# Patient Record
Sex: MALE | Race: WHITE | Employment: OTHER | URBAN - NONMETROPOLITAN AREA
[De-identification: names, ages, dates, MRNs, and addresses within clinical notes are randomized per-mention and may not be internally consistent; named-entity substitution may affect disease eponyms.]

---

## 2017-02-01 ENCOUNTER — OFFICE VISIT (OUTPATIENT)
Dept: CARDIOLOGY CLINIC | Age: 82
End: 2017-02-01

## 2017-02-01 VITALS
SYSTOLIC BLOOD PRESSURE: 116 MMHG | DIASTOLIC BLOOD PRESSURE: 80 MMHG | HEART RATE: 80 BPM | HEIGHT: 69 IN | BODY MASS INDEX: 31.7 KG/M2 | WEIGHT: 214 LBS

## 2017-02-01 DIAGNOSIS — I77.9 BILATERAL CAROTID ARTERY DISEASE (HCC): Primary | Chronic | ICD-10-CM

## 2017-02-01 DIAGNOSIS — E78.5 HYPERLIPIDEMIA, UNSPECIFIED HYPERLIPIDEMIA TYPE: ICD-10-CM

## 2017-02-01 DIAGNOSIS — I10 ESSENTIAL HYPERTENSION: Chronic | ICD-10-CM

## 2017-02-01 DIAGNOSIS — I73.9 PAD (PERIPHERAL ARTERY DISEASE) (HCC): ICD-10-CM

## 2017-02-01 PROCEDURE — 1036F TOBACCO NON-USER: CPT | Performed by: INTERNAL MEDICINE

## 2017-02-01 PROCEDURE — G8419 CALC BMI OUT NRM PARAM NOF/U: HCPCS | Performed by: INTERNAL MEDICINE

## 2017-02-01 PROCEDURE — 99213 OFFICE O/P EST LOW 20 MIN: CPT | Performed by: INTERNAL MEDICINE

## 2017-02-01 PROCEDURE — G8484 FLU IMMUNIZE NO ADMIN: HCPCS | Performed by: INTERNAL MEDICINE

## 2017-02-01 PROCEDURE — G8427 DOCREV CUR MEDS BY ELIG CLIN: HCPCS | Performed by: INTERNAL MEDICINE

## 2017-02-01 PROCEDURE — 4040F PNEUMOC VAC/ADMIN/RCVD: CPT | Performed by: INTERNAL MEDICINE

## 2017-02-01 PROCEDURE — 1123F ACP DISCUSS/DSCN MKR DOCD: CPT | Performed by: INTERNAL MEDICINE

## 2017-02-08 ENCOUNTER — OFFICE VISIT (OUTPATIENT)
Dept: ORTHOPEDIC SURGERY | Age: 82
End: 2017-02-08

## 2017-02-08 VITALS — WEIGHT: 214 LBS | HEIGHT: 69 IN | BODY MASS INDEX: 31.7 KG/M2

## 2017-02-08 DIAGNOSIS — M17.11 PRIMARY OSTEOARTHRITIS OF RIGHT KNEE: Primary | ICD-10-CM

## 2017-02-08 PROCEDURE — 20610 DRAIN/INJ JOINT/BURSA W/O US: CPT | Performed by: ORTHOPAEDIC SURGERY

## 2017-02-08 PROCEDURE — 4040F PNEUMOC VAC/ADMIN/RCVD: CPT | Performed by: ORTHOPAEDIC SURGERY

## 2017-02-08 PROCEDURE — 1123F ACP DISCUSS/DSCN MKR DOCD: CPT | Performed by: ORTHOPAEDIC SURGERY

## 2017-02-08 PROCEDURE — 99213 OFFICE O/P EST LOW 20 MIN: CPT | Performed by: ORTHOPAEDIC SURGERY

## 2017-02-08 PROCEDURE — G8484 FLU IMMUNIZE NO ADMIN: HCPCS | Performed by: ORTHOPAEDIC SURGERY

## 2017-02-08 PROCEDURE — 1036F TOBACCO NON-USER: CPT | Performed by: ORTHOPAEDIC SURGERY

## 2017-02-08 PROCEDURE — G8419 CALC BMI OUT NRM PARAM NOF/U: HCPCS | Performed by: ORTHOPAEDIC SURGERY

## 2017-02-08 PROCEDURE — G8427 DOCREV CUR MEDS BY ELIG CLIN: HCPCS | Performed by: ORTHOPAEDIC SURGERY

## 2017-02-21 RX ORDER — AMLODIPINE BESYLATE 5 MG/1
5 TABLET ORAL DAILY
Qty: 90 TABLET | Refills: 3 | Status: SHIPPED | OUTPATIENT
Start: 2017-02-21 | End: 2017-09-18 | Stop reason: SDUPTHER

## 2017-03-01 ENCOUNTER — OFFICE VISIT (OUTPATIENT)
Dept: ORTHOPEDIC SURGERY | Age: 82
End: 2017-03-01

## 2017-03-01 VITALS — BODY MASS INDEX: 31.7 KG/M2 | HEIGHT: 69 IN | WEIGHT: 214 LBS

## 2017-03-01 DIAGNOSIS — M25.561 ACUTE PAIN OF RIGHT KNEE: Primary | ICD-10-CM

## 2017-03-01 PROCEDURE — G8427 DOCREV CUR MEDS BY ELIG CLIN: HCPCS | Performed by: ORTHOPAEDIC SURGERY

## 2017-03-01 PROCEDURE — 4040F PNEUMOC VAC/ADMIN/RCVD: CPT | Performed by: ORTHOPAEDIC SURGERY

## 2017-03-01 PROCEDURE — 1036F TOBACCO NON-USER: CPT | Performed by: ORTHOPAEDIC SURGERY

## 2017-03-01 PROCEDURE — 1123F ACP DISCUSS/DSCN MKR DOCD: CPT | Performed by: ORTHOPAEDIC SURGERY

## 2017-03-01 PROCEDURE — 99213 OFFICE O/P EST LOW 20 MIN: CPT | Performed by: ORTHOPAEDIC SURGERY

## 2017-03-01 PROCEDURE — G8484 FLU IMMUNIZE NO ADMIN: HCPCS | Performed by: ORTHOPAEDIC SURGERY

## 2017-03-01 PROCEDURE — 73564 X-RAY EXAM KNEE 4 OR MORE: CPT | Performed by: ORTHOPAEDIC SURGERY

## 2017-03-01 PROCEDURE — G8417 CALC BMI ABV UP PARAM F/U: HCPCS | Performed by: ORTHOPAEDIC SURGERY

## 2017-03-15 ENCOUNTER — OFFICE VISIT (OUTPATIENT)
Dept: ORTHOPEDIC SURGERY | Age: 82
End: 2017-03-15

## 2017-03-15 VITALS — BODY MASS INDEX: 31.7 KG/M2 | WEIGHT: 214 LBS | HEIGHT: 69 IN

## 2017-03-15 DIAGNOSIS — M17.10 LOCALIZED OSTEOARTHROSIS, LOWER LEG: Primary | ICD-10-CM

## 2017-03-15 DIAGNOSIS — M17.10 PRIMARY OSTEOARTHRITIS OF KNEE, UNSPECIFIED LATERALITY: ICD-10-CM

## 2017-03-15 PROCEDURE — G8484 FLU IMMUNIZE NO ADMIN: HCPCS | Performed by: ORTHOPAEDIC SURGERY

## 2017-03-15 PROCEDURE — 4040F PNEUMOC VAC/ADMIN/RCVD: CPT | Performed by: ORTHOPAEDIC SURGERY

## 2017-03-15 PROCEDURE — 1123F ACP DISCUSS/DSCN MKR DOCD: CPT | Performed by: ORTHOPAEDIC SURGERY

## 2017-03-15 PROCEDURE — 1036F TOBACCO NON-USER: CPT | Performed by: ORTHOPAEDIC SURGERY

## 2017-03-15 PROCEDURE — G8417 CALC BMI ABV UP PARAM F/U: HCPCS | Performed by: ORTHOPAEDIC SURGERY

## 2017-03-15 PROCEDURE — G8427 DOCREV CUR MEDS BY ELIG CLIN: HCPCS | Performed by: ORTHOPAEDIC SURGERY

## 2017-03-15 PROCEDURE — 99213 OFFICE O/P EST LOW 20 MIN: CPT | Performed by: ORTHOPAEDIC SURGERY

## 2017-04-19 ENCOUNTER — OFFICE VISIT (OUTPATIENT)
Dept: ORTHOPEDIC SURGERY | Age: 82
End: 2017-04-19

## 2017-04-19 DIAGNOSIS — G89.29 CHRONIC PAIN OF RIGHT KNEE: Primary | ICD-10-CM

## 2017-04-19 DIAGNOSIS — M17.11 PRIMARY OSTEOARTHRITIS OF RIGHT KNEE: ICD-10-CM

## 2017-04-19 DIAGNOSIS — M25.561 CHRONIC PAIN OF RIGHT KNEE: Primary | ICD-10-CM

## 2017-04-19 PROCEDURE — 20610 DRAIN/INJ JOINT/BURSA W/O US: CPT | Performed by: ORTHOPAEDIC SURGERY

## 2017-04-19 PROCEDURE — 99214 OFFICE O/P EST MOD 30 MIN: CPT | Performed by: ORTHOPAEDIC SURGERY

## 2017-04-19 PROCEDURE — G8417 CALC BMI ABV UP PARAM F/U: HCPCS | Performed by: ORTHOPAEDIC SURGERY

## 2017-04-19 PROCEDURE — G8427 DOCREV CUR MEDS BY ELIG CLIN: HCPCS | Performed by: ORTHOPAEDIC SURGERY

## 2017-04-19 PROCEDURE — 1123F ACP DISCUSS/DSCN MKR DOCD: CPT | Performed by: ORTHOPAEDIC SURGERY

## 2017-04-19 PROCEDURE — 4040F PNEUMOC VAC/ADMIN/RCVD: CPT | Performed by: ORTHOPAEDIC SURGERY

## 2017-04-19 PROCEDURE — 1036F TOBACCO NON-USER: CPT | Performed by: ORTHOPAEDIC SURGERY

## 2017-04-21 ENCOUNTER — HOSPITAL ENCOUNTER (OUTPATIENT)
Dept: PHYSICAL THERAPY | Age: 82
Discharge: HOME | End: 2017-04-30
Admitting: ORTHOPAEDIC SURGERY

## 2017-04-26 ENCOUNTER — HOSPITAL ENCOUNTER (OUTPATIENT)
Dept: PHYSICAL THERAPY | Age: 82
Discharge: HOME | End: 2017-04-26
Admitting: ORTHOPAEDIC SURGERY

## 2017-04-28 ENCOUNTER — HOSPITAL ENCOUNTER (OUTPATIENT)
Dept: PHYSICAL THERAPY | Age: 82
Discharge: HOME | End: 2017-04-28
Admitting: ORTHOPAEDIC SURGERY

## 2017-05-03 ENCOUNTER — HOSPITAL ENCOUNTER (OUTPATIENT)
Dept: PHYSICAL THERAPY | Age: 82
Discharge: HOME | End: 2017-05-03
Admitting: ORTHOPAEDIC SURGERY

## 2017-05-05 ENCOUNTER — HOSPITAL ENCOUNTER (OUTPATIENT)
Dept: PHYSICAL THERAPY | Age: 82
Discharge: HOME | End: 2017-05-05
Admitting: ORTHOPAEDIC SURGERY

## 2017-05-10 ENCOUNTER — HOSPITAL ENCOUNTER (OUTPATIENT)
Dept: PHYSICAL THERAPY | Age: 82
Discharge: HOME | End: 2017-05-10
Admitting: ORTHOPAEDIC SURGERY

## 2017-05-12 ENCOUNTER — HOSPITAL ENCOUNTER (OUTPATIENT)
Dept: PHYSICAL THERAPY | Age: 82
Discharge: HOME | End: 2017-05-12
Admitting: ORTHOPAEDIC SURGERY

## 2017-05-17 ENCOUNTER — HOSPITAL ENCOUNTER (OUTPATIENT)
Dept: PHYSICAL THERAPY | Age: 82
Discharge: HOME | End: 2017-05-17
Admitting: ORTHOPAEDIC SURGERY

## 2017-05-19 ENCOUNTER — HOSPITAL ENCOUNTER (OUTPATIENT)
Dept: PHYSICAL THERAPY | Age: 82
Discharge: HOME | End: 2017-05-19
Admitting: ORTHOPAEDIC SURGERY

## 2017-05-24 ENCOUNTER — HOSPITAL ENCOUNTER (OUTPATIENT)
Dept: PHYSICAL THERAPY | Age: 82
Discharge: HOME | End: 2017-05-24
Admitting: ORTHOPAEDIC SURGERY

## 2017-05-31 ENCOUNTER — OFFICE VISIT (OUTPATIENT)
Dept: ORTHOPEDIC SURGERY | Age: 82
End: 2017-05-31

## 2017-05-31 VITALS — HEIGHT: 69 IN | WEIGHT: 214 LBS | BODY MASS INDEX: 31.7 KG/M2

## 2017-05-31 DIAGNOSIS — M17.0 PRIMARY OSTEOARTHRITIS OF BOTH KNEES: Primary | ICD-10-CM

## 2017-05-31 PROCEDURE — 1036F TOBACCO NON-USER: CPT | Performed by: ORTHOPAEDIC SURGERY

## 2017-05-31 PROCEDURE — G8427 DOCREV CUR MEDS BY ELIG CLIN: HCPCS | Performed by: ORTHOPAEDIC SURGERY

## 2017-05-31 PROCEDURE — G8417 CALC BMI ABV UP PARAM F/U: HCPCS | Performed by: ORTHOPAEDIC SURGERY

## 2017-05-31 PROCEDURE — 4040F PNEUMOC VAC/ADMIN/RCVD: CPT | Performed by: ORTHOPAEDIC SURGERY

## 2017-05-31 PROCEDURE — 1123F ACP DISCUSS/DSCN MKR DOCD: CPT | Performed by: ORTHOPAEDIC SURGERY

## 2017-05-31 PROCEDURE — 99213 OFFICE O/P EST LOW 20 MIN: CPT | Performed by: ORTHOPAEDIC SURGERY

## 2017-06-02 ENCOUNTER — HOSPITAL ENCOUNTER (OUTPATIENT)
Dept: PHYSICAL THERAPY | Age: 82
Discharge: HOME | End: 2017-06-02
Admitting: ORTHOPAEDIC SURGERY

## 2017-06-06 ENCOUNTER — HOSPITAL ENCOUNTER (OUTPATIENT)
Dept: PHYSICAL THERAPY | Age: 82
Discharge: HOME | End: 2017-06-06
Admitting: ORTHOPAEDIC SURGERY

## 2017-06-09 ENCOUNTER — HOSPITAL ENCOUNTER (OUTPATIENT)
Dept: PHYSICAL THERAPY | Age: 82
Discharge: HOME | End: 2017-06-09
Admitting: ORTHOPAEDIC SURGERY

## 2017-06-14 ENCOUNTER — HOSPITAL ENCOUNTER (OUTPATIENT)
Dept: PHYSICAL THERAPY | Age: 82
Discharge: HOME | End: 2017-06-14
Admitting: ORTHOPAEDIC SURGERY

## 2017-06-16 ENCOUNTER — HOSPITAL ENCOUNTER (OUTPATIENT)
Dept: PHYSICAL THERAPY | Age: 82
Discharge: HOME | End: 2017-06-16
Admitting: ORTHOPAEDIC SURGERY

## 2017-06-21 ENCOUNTER — HOSPITAL ENCOUNTER (OUTPATIENT)
Dept: PHYSICAL THERAPY | Age: 82
Discharge: HOME | End: 2017-06-21
Admitting: ORTHOPAEDIC SURGERY

## 2017-06-23 ENCOUNTER — HOSPITAL ENCOUNTER (OUTPATIENT)
Dept: PHYSICAL THERAPY | Age: 82
Discharge: HOME | End: 2017-06-23
Admitting: ORTHOPAEDIC SURGERY

## 2017-06-27 ENCOUNTER — HOSPITAL ENCOUNTER (OUTPATIENT)
Dept: PHYSICAL THERAPY | Age: 82
Discharge: HOME | End: 2017-06-27
Admitting: ORTHOPAEDIC SURGERY

## 2017-06-30 ENCOUNTER — HOSPITAL ENCOUNTER (OUTPATIENT)
Dept: PHYSICAL THERAPY | Age: 82
Discharge: HOME | End: 2017-06-30
Admitting: ORTHOPAEDIC SURGERY

## 2017-07-05 ENCOUNTER — TELEPHONE (OUTPATIENT)
Dept: CARDIOLOGY CLINIC | Age: 82
End: 2017-07-05

## 2017-07-05 ENCOUNTER — HOSPITAL ENCOUNTER (OUTPATIENT)
Dept: PHYSICAL THERAPY | Age: 82
Discharge: HOME | End: 2017-07-05
Admitting: ORTHOPAEDIC SURGERY

## 2017-07-05 RX ORDER — ISOSORBIDE MONONITRATE 120 MG/1
120 TABLET, EXTENDED RELEASE ORAL DAILY
Qty: 90 TABLET | Refills: 3 | Status: SHIPPED | OUTPATIENT
Start: 2017-07-05

## 2017-07-12 ENCOUNTER — HOSPITAL ENCOUNTER (OUTPATIENT)
Dept: PHYSICAL THERAPY | Age: 82
Discharge: HOME | End: 2017-07-12
Admitting: ORTHOPAEDIC SURGERY

## 2017-07-14 ENCOUNTER — HOSPITAL ENCOUNTER (OUTPATIENT)
Dept: PHYSICAL THERAPY | Age: 82
Discharge: HOME | End: 2017-07-14
Admitting: ORTHOPAEDIC SURGERY

## 2017-07-21 ENCOUNTER — HOSPITAL ENCOUNTER (OUTPATIENT)
Dept: PHYSICAL THERAPY | Age: 82
Discharge: HOME | End: 2017-07-21
Admitting: ORTHOPAEDIC SURGERY

## 2017-07-27 ENCOUNTER — HOSPITAL ENCOUNTER (OUTPATIENT)
Dept: PHYSICAL THERAPY | Age: 82
Discharge: HOME | End: 2017-07-27
Admitting: ORTHOPAEDIC SURGERY

## 2017-08-02 ENCOUNTER — HOSPITAL ENCOUNTER (OUTPATIENT)
Dept: PHYSICAL THERAPY | Age: 82
Discharge: HOME | End: 2017-08-02
Admitting: ORTHOPAEDIC SURGERY

## 2017-08-09 ENCOUNTER — HOSPITAL ENCOUNTER (OUTPATIENT)
Dept: PHYSICAL THERAPY | Age: 82
Discharge: HOME | End: 2017-08-09
Admitting: ORTHOPAEDIC SURGERY

## 2017-08-16 ENCOUNTER — HOSPITAL ENCOUNTER (OUTPATIENT)
Dept: PHYSICAL THERAPY | Age: 82
Discharge: HOME | End: 2017-08-16
Admitting: ORTHOPAEDIC SURGERY

## 2017-09-06 ENCOUNTER — HOSPITAL ENCOUNTER (OUTPATIENT)
Dept: PHYSICAL THERAPY | Age: 82
Discharge: HOME | End: 2017-09-06
Admitting: ORTHOPAEDIC SURGERY

## 2017-09-13 ENCOUNTER — HOSPITAL ENCOUNTER (OUTPATIENT)
Dept: PHYSICAL THERAPY | Age: 82
Discharge: HOME | End: 2017-09-13
Admitting: ORTHOPAEDIC SURGERY

## 2017-09-18 ENCOUNTER — OFFICE VISIT (OUTPATIENT)
Dept: CARDIOLOGY CLINIC | Age: 82
End: 2017-09-18

## 2017-09-18 VITALS
SYSTOLIC BLOOD PRESSURE: 118 MMHG | DIASTOLIC BLOOD PRESSURE: 68 MMHG | HEIGHT: 69 IN | WEIGHT: 210 LBS | BODY MASS INDEX: 31.1 KG/M2 | HEART RATE: 80 BPM

## 2017-09-18 DIAGNOSIS — I77.9 BILATERAL CAROTID ARTERY DISEASE (HCC): Primary | Chronic | ICD-10-CM

## 2017-09-18 DIAGNOSIS — I10 ESSENTIAL HYPERTENSION: Chronic | ICD-10-CM

## 2017-09-18 DIAGNOSIS — E78.5 HYPERLIPIDEMIA, UNSPECIFIED HYPERLIPIDEMIA TYPE: ICD-10-CM

## 2017-09-18 PROCEDURE — 4040F PNEUMOC VAC/ADMIN/RCVD: CPT | Performed by: INTERNAL MEDICINE

## 2017-09-18 PROCEDURE — G8417 CALC BMI ABV UP PARAM F/U: HCPCS | Performed by: INTERNAL MEDICINE

## 2017-09-18 PROCEDURE — 1036F TOBACCO NON-USER: CPT | Performed by: INTERNAL MEDICINE

## 2017-09-18 PROCEDURE — 99214 OFFICE O/P EST MOD 30 MIN: CPT | Performed by: INTERNAL MEDICINE

## 2017-09-18 PROCEDURE — 1123F ACP DISCUSS/DSCN MKR DOCD: CPT | Performed by: INTERNAL MEDICINE

## 2017-09-18 PROCEDURE — G8427 DOCREV CUR MEDS BY ELIG CLIN: HCPCS | Performed by: INTERNAL MEDICINE

## 2017-09-18 RX ORDER — MULTIVIT WITH MINERALS/LUTEIN
TABLET ORAL DAILY
COMMUNITY

## 2017-09-18 RX ORDER — AMLODIPINE BESYLATE 5 MG/1
5 TABLET ORAL DAILY
Qty: 90 TABLET | Refills: 3 | Status: SHIPPED | OUTPATIENT
Start: 2017-09-18 | End: 2018-02-06 | Stop reason: SDUPTHER

## 2017-09-20 ENCOUNTER — HOSPITAL ENCOUNTER (OUTPATIENT)
Dept: PHYSICAL THERAPY | Age: 82
Discharge: HOME | End: 2017-09-20
Admitting: ORTHOPAEDIC SURGERY

## 2017-09-27 ENCOUNTER — HOSPITAL ENCOUNTER (OUTPATIENT)
Dept: PHYSICAL THERAPY | Age: 82
Discharge: HOME | End: 2017-09-27
Admitting: ORTHOPAEDIC SURGERY

## 2017-10-04 ENCOUNTER — HOSPITAL ENCOUNTER (OUTPATIENT)
Dept: PHYSICAL THERAPY | Age: 82
Discharge: HOME | End: 2017-10-04
Admitting: ORTHOPAEDIC SURGERY

## 2017-10-04 NOTE — FLOWSHEET NOTE
Static EC on airex and A/P weight shifts 5 10s              Therapeutic Exercise and NMR EXR  [x] (40328) Provided verbal/tactile cueing for activities related to strengthening, flexibility, endurance, ROM for improvements in LE, proximal hip, and core control with self care, mobility, lifting, ambulation.  [] (83473) Provided verbal/tactile cueing for activities related to improving balance, coordination, kinesthetic sense, posture, motor skill, proprioception  to assist with LE, proximal hip, and core control in self care, mobility, lifting, ambulation and eccentric single leg control.      NMR and Therapeutic Activities:    [x] (41251 or 25022) Provided verbal/tactile cueing for activities related to improving balance, coordination, kinesthetic sense, posture, motor skill, proprioception and motor activation to allow for proper function of core, proximal hip and LE with self care and ADLs  [] (66381) Gait Re-education- Provided training and instruction to the patient for proper LE, core and proximal hip recruitment and positioning and eccentric body weight control with ambulation re-education including up and down stairs     Home Exercise Program:    [x] (19178) Reviewed/Progressed HEP activities related to strengthening, flexibility, endurance, ROM of core, proximal hip and LE for functional self-care, mobility, lifting and ambulation/stair navigation   [] (69381)Reviewed/Progressed HEP activities related to improving balance, coordination, kinesthetic sense, posture, motor skill, proprioception of core, proximal hip and LE for self care, mobility, lifting, and ambulation/stair navigation      Manual Treatments:  PROM / STM / Oscillations-Mobs:  G-I, II, III, IV (PA's, Inf., Post.)  [x] (12231) Provided manual therapy to mobilize LE, proximal hip and/or LS spine soft tissue/joints for the purpose of modulating pain, promoting relaxation,  increasing ROM, reducing/eliminating soft tissue swelling/inflammation/restriction, improving soft tissue extensibility and allowing for proper ROM for normal function with self care, mobility, lifting and ambulation. Modalities:    Charges:   Timed Code Treatment Minutes: 48   Total Treatment Minutes: 48     [] EVAL (LOW) 41689 (typically 20 minutes face-to-face)  [] EVAL (MOD) 32232 (typically 30 minutes face-to-face)  [] EVAL (HIGH) 23004 (typically 45 minutes face-to-face)  [] RE-EVAL     [x] CB(63149) x  2 KX  [] IONTO  [] NMR (18589) x      [] VASO  [x] Manual (62562) x  1  KX [] Other:  [] TA x       [] Mech Traction (92436)  [] ES(attended) (01447)      [] ES (un) (67108):     GOALS:     Patient stated goal: less pain and movement    Therapist goals for Patient:   Short Term Goals: To be achieved in: 2 weeks  1. Independent in HEP and progression per patient tolerance, in order to prevent re-injury. Met  2. Patient will have a decrease in pain to facilitate improvement in movement, function, and ADLs as indicated by Functional Deficits. Met    Long Term Goals: To be achieved in: 6 weeks  1. Disability index score of 30% or less for the LEFS to assist with reaching prior level of function. LEFS 38.75% (IE 60%)  2. Patient will demonstrate increased AROM to symmetrical to allow for proper joint functioning as indicated by patients Functional Deficits. - 75% MET  3. Patient will demonstrate an increase in Strength to good proximal hip strength and control, within 5lb HHD in LE to allow for proper functional mobility as indicated by patients Functional Deficits. - 75% MET  4. Patient will return to daily increased physical and functional activities without increased symptoms or restriction. - 75% MET    Progression Towards Functional goals:  [x] Patient is progressing as expected towards functional goals listed. [] Progression is slowed due to complexities listed. [] Progression has been slowed due to co-morbidities.   [] Plan just implemented, too soon to assess goals progression  [] Other:     ASSESSMENT:  Knee extension now back to baseline. Patient has been responding well to [facility] and stretching with improvement in knee symptoms at conclusion, encouraged patient to use  on adductors at home as well. Continued deficits in RLE functional strength.      Return to Play: (if applicable)   []  Stage 1: Intro to Strength   []  Stage 2: Return to Run and Strength   []  Stage 3: Return to Jump and Strength   []  Stage 4: Dynamic Strength and Agility   []  Stage 5: Sport Specific Training     []  Ready to Return to Play, Meets All Above Stages   []  Not Ready for Return to Sports   Comments:                         Treatment/Activity Tolerance:  [x] Patient tolerated treatment well [] Patient limited by fatique  [] Patient limited by pain  [] Patient limited by other medical complications  [] Other:     Prognosis: [x] Good [] Fair  [] Poor    Patient Requires Follow-up: [x] Yes  [] No      PLAN:   [x] Continue per plan of care [] Alter current plan (see comments)  [] Plan of care initiated [] Hold pending MD visit [] Discharge    Electronically signed by: Manuela Arrington PT

## 2017-10-18 ENCOUNTER — HOSPITAL ENCOUNTER (OUTPATIENT)
Dept: PHYSICAL THERAPY | Age: 82
Discharge: HOME | End: 2017-10-18
Admitting: ORTHOPAEDIC SURGERY

## 2017-10-18 NOTE — FLOWSHEET NOTE
[facility], [facility]        Physical Therapy Daily Treatment Note        Date:  10/18/2017    Patient Name:  Marleny Stevens  [facility]" :  9/3/1928  MRN: [id number]  Restrictions/Precautions:    Medical/Treatment Diagnosis Information:  Diagnosis: chronic R knee pain  Treatment Diagnosis: Chronic Pain of Right knee M25.561, M65.90  Insurance/Certification information:  PT Insurance Information: [employer]/Medicare  Physician Information:  Referring Practitioner: Dr. Miladis Thakkar of care signed (Y/N):     Date of Patient follow up with Physician:  Jason Bowman    G-Code (if applicable):      Date G-Code Applied:  17       Progress Note: [x]  Yes  []  No  Next due by: Visit #20       Latex Allergy:  [x]NO      []YES  Preferred Language for Healthcare:   [x]English       []other:     Visit # Insurance Allowable   33  (KX) MCR        Pain: 0-1/10    Subjective: Patient states that his knee has been doing well overall, feeling a little more stiff the last couple days for some reason, possibly due to a shift in the weather. Objective:     RESTRICTIONS/PRECAUTIONS: R knee OA, weak R glute, R ITB syndrome    Exercises/Interventions:     Therapeutic Ex x  30 min Sets/sec Reps Notes   BIKE 5min     QS 5 12    SAQ 5 15 3# full roll   SLR 1 10 0#   TKE 5 30 40#   SC  10 4 way, UE support   Slide Lunge 2 10 Retro at counter   Leg Press - DL 3 10 80#   Calf stretch/HS stretch 30 3 slantboard/seated         Manual Intervention x 18 min      Knee mobs/PROM      Tib/Fem Mobs 8 min  Flex/ext   Patella Mobs 2 min     STM - manual 8 min  R quad   IT band mobs 0 min           NMR re-education x 0 min      [place]ian/Biofeedback 10/10      G. Med activation/sidelying      G. Max Activation/prone      Hip Ext full ROM G.  Activation      Bosu Bal and Prop- G Med      Single leg stance/Balance/Prop 5 10s Tandem stance with SBA    Bosu Retro G. Med act      Static EC on airex and A/P weight shifts 5 10s              Therapeutic Exercise and NMR EXR  [x] (19827) Provided verbal/tactile cueing for activities related to strengthening, flexibility, endurance, ROM for improvements in LE, proximal hip, and core control with self care, mobility, lifting, ambulation.  [] (55161) Provided verbal/tactile cueing for activities related to improving balance, coordination, kinesthetic sense, posture, motor skill, proprioception  to assist with LE, proximal hip, and core control in self care, mobility, lifting, ambulation and eccentric single leg control.      NMR and Therapeutic Activities:    [x] (64947 or 14810) Provided verbal/tactile cueing for activities related to improving balance, coordination, kinesthetic sense, posture, motor skill, proprioception and motor activation to allow for proper function of core, proximal hip and LE with self care and ADLs  [] (63286) Gait Re-education- Provided training and instruction to the patient for proper LE, core and proximal hip recruitment and positioning and eccentric body weight control with ambulation re-education including up and down stairs     Home Exercise Program:    [x] (78758) Reviewed/Progressed HEP activities related to strengthening, flexibility, endurance, ROM of core, proximal hip and LE for functional self-care, mobility, lifting and ambulation/stair navigation   [] (21479)Reviewed/Progressed HEP activities related to improving balance, coordination, kinesthetic sense, posture, motor skill, proprioception of core, proximal hip and LE for self care, mobility, lifting, and ambulation/stair navigation      Manual Treatments:  PROM / STM / Oscillations-Mobs:  G-I, II, III, IV (PA's, Inf., Post.)  [x] (76803) Provided manual therapy to mobilize LE, proximal hip and/or LS spine soft tissue/joints for the purpose of modulating pain, promoting relaxation,  increasing ROM, reducing/eliminating soft tissue swelling/inflammation/restriction, improving soon to assess goals progression  [] Other:     ASSESSMENT:  Patient has been responding well to [facility] and stretching with improvement in knee symptoms at conclusion. Continued deficits in RLE functional strength.      Return to Play: (if applicable)   []  Stage 1: Intro to Strength   []  Stage 2: Return to Run and Strength   []  Stage 3: Return to Jump and Strength   []  Stage 4: Dynamic Strength and Agility   []  Stage 5: Sport Specific Training     []  Ready to Return to Play, Meets All Above Stages   []  Not Ready for Return to Sports   Comments:                         Treatment/Activity Tolerance:  [x] Patient tolerated treatment well [] Patient limited by fatique  [] Patient limited by pain  [] Patient limited by other medical complications  [] Other:     Prognosis: [x] Good [] Fair  [] Poor    Patient Requires Follow-up: [x] Yes  [] No      PLAN:   [x] Continue per plan of care [] Alter current plan (see comments)  [] Plan of care initiated [] Hold pending MD visit [] Discharge    Electronically signed by: Larissa Motley PT

## 2017-11-01 ENCOUNTER — HOSPITAL ENCOUNTER (OUTPATIENT)
Dept: OTHER | Age: 82
Discharge: HOME | End: 2017-11-30
Attending: ORTHOPAEDIC SURGERY | Admitting: ORTHOPAEDIC SURGERY

## 2017-11-01 ENCOUNTER — HOSPITAL ENCOUNTER (OUTPATIENT)
Dept: PHYSICAL THERAPY | Age: 82
Discharge: HOME | End: 2017-11-01
Admitting: ORTHOPAEDIC SURGERY

## 2017-11-01 NOTE — FLOWSHEET NOTE
quad   IT band mobs 0 min           NMR re-education x 0 min      Filipino/Biofeedback 10/10      G. Med activation/sidelying      G. Max Activation/prone      Hip Ext full ROM G. Activation      Bosu Bal and Prop- G Med      Single leg stance/Balance/Prop 5 10s Tandem stance with SBA    Bosu Retro G. Med act      Static EC on airex and A/P weight shifts 5 10s              Therapeutic Exercise and NMR EXR  [x] (91258) Provided verbal/tactile cueing for activities related to strengthening, flexibility, endurance, ROM for improvements in LE, proximal hip, and core control with self care, mobility, lifting, ambulation.  [] (40393) Provided verbal/tactile cueing for activities related to improving balance, coordination, kinesthetic sense, posture, motor skill, proprioception  to assist with LE, proximal hip, and core control in self care, mobility, lifting, ambulation and eccentric single leg control.      NMR and Therapeutic Activities:    [x] (24578 or 07250) Provided verbal/tactile cueing for activities related to improving balance, coordination, kinesthetic sense, posture, motor skill, proprioception and motor activation to allow for proper function of core, proximal hip and LE with self care and ADLs  [] (57352) Gait Re-education- Provided training and instruction to the patient for proper LE, core and proximal hip recruitment and positioning and eccentric body weight control with ambulation re-education including up and down stairs     Home Exercise Program:    [x] (55898) Reviewed/Progressed HEP activities related to strengthening, flexibility, endurance, ROM of core, proximal hip and LE for functional self-care, mobility, lifting and ambulation/stair navigation   [] (25163)Reviewed/Progressed HEP activities related to improving balance, coordination, kinesthetic sense, posture, motor skill, proprioception of core, proximal hip and LE for self care, mobility, lifting, and ambulation/stair navigation      Manual Treatments:  PROM / STM / Oscillations-Mobs:  G-I, II, III, IV (PA's, Inf., Post.)  [x] (78646) Provided manual therapy to mobilize LE, proximal hip and/or LS spine soft tissue/joints for the purpose of modulating pain, promoting relaxation,  increasing ROM, reducing/eliminating soft tissue swelling/inflammation/restriction, improving soft tissue extensibility and allowing for proper ROM for normal function with self care, mobility, lifting and ambulation. Modalities:    Charges:   Timed Code Treatment Minutes: 18   Total Treatment Minutes: 18     [] EVAL (LOW) 91082 (typically 20 minutes face-to-face)  [] EVAL (MOD) 19763 (typically 30 minutes face-to-face)  [] EVAL (HIGH) 78766 (typically 45 minutes face-to-face)  [] RE-EVAL     [x] UL(02415) x  1 KX  [] IONTO  [] NMR (08124) x      [] VASO  [x] Manual (39094) x  1  KX [] Other:  [] TA x       [] Mech Traction (28531)  [] ES(attended) (37326)      [] ES (un) (26671):     GOALS:     Patient stated goal: less pain and movement    Therapist goals for Patient:   Short Term Goals: To be achieved in: 2 weeks  1. Independent in HEP and progression per patient tolerance, in order to prevent re-injury. Met  2. Patient will have a decrease in pain to facilitate improvement in movement, function, and ADLs as indicated by Functional Deficits. Met    Long Term Goals: To be achieved in: 6 weeks  1. Disability index score of 30% or less for the LEFS to assist with reaching prior level of function. LEFS 38.75% (IE 60%)  2. Patient will demonstrate increased AROM to symmetrical to allow for proper joint functioning as indicated by patients Functional Deficits. - 75% MET  3. Patient will demonstrate an increase in Strength to good proximal hip strength and control, within 5lb HHD in LE to allow for proper functional mobility as indicated by patients Functional Deficits. - 75% MET  4.  Patient will return to daily increased physical and functional activities without increased

## 2017-11-08 ENCOUNTER — HOSPITAL ENCOUNTER (OUTPATIENT)
Dept: PHYSICAL THERAPY | Age: 82
Discharge: HOME | End: 2017-11-08
Admitting: ORTHOPAEDIC SURGERY

## 2017-11-08 NOTE — FLOWSHEET NOTE
[facility], [place]        Physical Therapy Daily Treatment Note        Date:  2017    Patient Name:  Virgil Simon  [facility]" :  9/3/1928  MRN: [id number]  Restrictions/Precautions:    Medical/Treatment Diagnosis Information:  Diagnosis: chronic R knee pain  Treatment Diagnosis: Chronic Pain of Right knee M25.561, A55.62  Insurance/Certification information:  PT Insurance Information: [employer]/Medicare  Physician Information:  Referring Practitioner: Dr. Miriam Chinchilla of care signed (Y/N):     Date of Patient follow up with Physician:  Marshall Mendoza    G-Code (if applicable):      Date G-Code Applied:  17       Progress Note: [x]  Yes  []  No  Next due by: Visit #40       Latex Allergy:  [x]NO      []YES  Preferred Language for Healthcare:   [x]English       []other:     Visit # Insurance Allowable   35  (KX) MCR        Pain: 0-1/10    Subjective: Patient states that he is no longer having chest pains like he was last week. Knee feeling a little stiff today, but he is feeling a little stiff all over due to the changing weather. Still some discomfort along the distal adductors and medial hamstring attachment. Objective:     RESTRICTIONS/PRECAUTIONS: R knee OA, weak R glute, R ITB syndrome    Exercises/Interventions:     Therapeutic Ex x 30 min Sets/sec Reps Notes   BIKE 5 min     SAQ 5 15 4# full roll   SLR 1 10 0#   TKE 5 30 40#   SC March 1 10 4 way, UE support   Slide Lunge 2 10 Retro at counter   Leg Press - DL 3 10 80#   Cybex HS curl 2 12 25#   Calf stretch/HS stretch 30 3 slantboard/seated         Manual Intervention x 14 min      Knee mobs/PROM      Tib/Fem Mobs 6 min  Flex/ext   Patella Mobs 2 min     STM - manual 6 min  R quad   IT band mobs 0 min           NMR re-education x 0 min      Stateless/Biofeedback 10/10      G. Med activation/sidelying      G. Max Activation/prone      Hip Ext full ROM G.  Activation      Bosu Bal and Prop- G Med

## 2017-11-22 ENCOUNTER — HOSPITAL ENCOUNTER (OUTPATIENT)
Dept: PHYSICAL THERAPY | Age: 82
Discharge: HOME | End: 2017-11-22
Admitting: ORTHOPAEDIC SURGERY

## 2017-11-22 NOTE — FLOWSHEET NOTE
[facility]  Orthopaedics and Sports Rehabilitation, [place]        Physical Therapy Daily Treatment Note        Date:  2017    Patient Name:  Karon Loyd  [facility]" :  9/3/1928  MRN: [id number]  Restrictions/Precautions:    Medical/Treatment Diagnosis Information:  Diagnosis: chronic R knee pain  Treatment Diagnosis: Chronic Pain of Right knee M25.561, Q60.12  Insurance/Certification information:  PT Insurance Information: [facility]/Medicare  Physician Information:  Referring Practitioner: Dr. Ayanna Danielsr of care signed (Y/N):     Date of Patient follow up with Physician:  Maria G Smith    G-Code (if applicable):      Date G-Code Applied:  17       Progress Note: [x]  Yes  []  No  Next due by: Visit #40       Latex Allergy:  [x]NO      []YES  Preferred Language for Healthcare:   [x]English       []other:     Visit # Insurance Allowable   36  (KX) MCR        Pain: 0-1/10    Subjective: Patient reports increased stiffness in aching in R knee due to change in weather. Objective:     RESTRICTIONS/PRECAUTIONS: R knee OA, weak R glute, R ITB syndrome    Exercises/Interventions:     Therapeutic Ex x 30 min Sets/sec Reps Notes   BIKE 5 min     SAQ 5 15 4# full roll   SLR 1 10 0#   TKE 5 30 40#   SC March 1 10 4 way, UE support   Slide Lunge 2 10 Retro at counter   Leg Press - DL 3 10 80#   Cybex HS curl 2 12 25#   Calf stretch/HS stretch 30 3 slantboard/seated         Manual Intervention x 14 min      Knee mobs/PROM      Tib/Fem Mobs 6 min  Flex/ext   Patella Mobs 2 min     STM - manual 6 min  R quad   IT band mobs 0 min           NMR re-education x 0 min      [place]oran/Biofeedback 10/10      G. Med activation/sidelying      G. Max Activation/prone      Hip Ext full ROM G.  Activation      Bosu Bal and Prop- G Med      Single leg stance/Balance/Prop 5 10s Tandem stance with SBA    Bosu Retro G. Med act      Static EC on airex and A/P weight shifts 5 10s              Therapeutic Exercise and NMR EXR  [x] (79121) Provided verbal/tactile cueing for activities related to strengthening, flexibility, endurance, ROM for improvements in LE, proximal hip, and core control with self care, mobility, lifting, ambulation.  [] (06966) Provided verbal/tactile cueing for activities related to improving balance, coordination, kinesthetic sense, posture, motor skill, proprioception  to assist with LE, proximal hip, and core control in self care, mobility, lifting, ambulation and eccentric single leg control.      NMR and Therapeutic Activities:    [x] (58385 or 63156) Provided verbal/tactile cueing for activities related to improving balance, coordination, kinesthetic sense, posture, motor skill, proprioception and motor activation to allow for proper function of core, proximal hip and LE with self care and ADLs  [] (54488) Gait Re-education- Provided training and instruction to the patient for proper LE, core and proximal hip recruitment and positioning and eccentric body weight control with ambulation re-education including up and down stairs     Home Exercise Program:    [x] (30736) Reviewed/Progressed HEP activities related to strengthening, flexibility, endurance, ROM of core, proximal hip and LE for functional self-care, mobility, lifting and ambulation/stair navigation   [] (39132)Reviewed/Progressed HEP activities related to improving balance, coordination, kinesthetic sense, posture, motor skill, proprioception of core, proximal hip and LE for self care, mobility, lifting, and ambulation/stair navigation      Manual Treatments:  PROM / STM / Oscillations-Mobs:  G-I, II, III, IV (PA's, Inf., Post.)  [x] (94599) Provided manual therapy to mobilize LE, proximal hip and/or LS spine soft tissue/joints for the purpose of modulating pain, promoting relaxation,  increasing ROM, reducing/eliminating soft tissue swelling/inflammation/restriction, improving soft tissue extensibility and allowing for proper ROM for normal Patient has been responding well to [facility] and stretching with improvement in knee symptoms at conclusion. Continued deficits in RLE functional strength, especially eccentrically.      Return to Play: (if applicable)   []  Stage 1: Intro to Strength   []  Stage 2: Return to Run and Strength   []  Stage 3: Return to Jump and Strength   []  Stage 4: Dynamic Strength and Agility   []  Stage 5: Sport Specific Training     []  Ready to Return to Play, Meets All Above Stages   []  Not Ready for Return to Sports   Comments:                         Treatment/Activity Tolerance:  [x] Patient tolerated treatment well [] Patient limited by fatique  [] Patient limited by pain  [] Patient limited by other medical complications  [] Other:     Prognosis: [x] Good [] Fair  [] Poor    Patient Requires Follow-up: [x] Yes  [] No      PLAN:   [x] Continue per plan of care [] Alter current plan (see comments)  [] Plan of care initiated [] Hold pending MD visit [] Discharge    Electronically signed by: Eric Ceballos PTA

## 2017-12-01 ENCOUNTER — TELEPHONE (OUTPATIENT)
Dept: CARDIOLOGY CLINIC | Age: 82
End: 2017-12-01

## 2017-12-01 ENCOUNTER — HOSPITAL ENCOUNTER (OUTPATIENT)
Dept: OTHER | Age: 82
Discharge: HOME | End: 2017-12-31
Attending: ORTHOPAEDIC SURGERY | Admitting: ORTHOPAEDIC SURGERY

## 2017-12-06 ENCOUNTER — HOSPITAL ENCOUNTER (OUTPATIENT)
Dept: PHYSICAL THERAPY | Age: 82
Discharge: HOME | End: 2017-12-06
Admitting: ORTHOPAEDIC SURGERY

## 2017-12-06 NOTE — FLOWSHEET NOTE
[facility]P [facility]  Orthopaedics and Sports Rehabilitation, [place]        Physical Therapy Daily Treatment Note        Date:  2017    Patient Name:  Radha Parish  [facility]" :  9/3/1928  MRN: [id number]  Restrictions/Precautions:    Medical/Treatment Diagnosis Information:  Diagnosis: chronic R knee pain  Treatment Diagnosis: Chronic Pain of Right knee M25.561, I15.70  Insurance/Certification information:  PT Insurance Information: Terry Abebe/Medicare  Physician Information:  Referring Practitioner: Dr. Abisai Clark of care signed (Y/N):     Date of Patient follow up with Physician:  Orion Curran    G-Code (if applicable):      Date G-Code Applied:  17       Progress Note: [x]  Yes  []  No  Next due by: Visit #40       Latex Allergy:  [x]NO      []YES  Preferred Language for Healthcare:   [x]English       []other:     Visit # Insurance Allowable   37  (KX) MCR        Pain: 0-1/10    Subjective: Patient reports feeling better since last treatment session. Inclement weather has increased acheyness but feels better with exercise and stretching during therapy sessions especially for walking. Objective:      RESTRICTIONS/PRECAUTIONS: R knee OA, weak R glute, R ITB syndrome    Exercises/Interventions:     Therapeutic Ex x 30 min Sets/sec Reps Notes   BIKE 5 min     SAQ 5 15 4# full roll   SLR 1 10 0#   TKE 5 00 40#   SC March 1 10 4 way, UE support   Slide Lunge 2 10 Retro at counter   Leg Press - DL 3 10 80#   Cybex HS curl 2 12 25#   Calf stretch/HS stretch 30 3 slantboard/seated         Manual Intervention x 14 min      Knee mobs/PROM      Tib/Fem Mobs 6 min  Flex/ext   Patella Mobs 2 min     STM - manual 6 min  R quad/lateral quad   IT band mobs 0 min           NMR re-education x 0 min      Chadian/Biofeedback 10/10      G. Med activation/sidelying      G. Max Activation/prone      Hip Ext full ROM G.  Activation      Bosu Bal and Prop- G Med      Single leg stance/Balance/Prop 5 10s Tandem stance with SBA    Bosu Retro G. Med act      Static EC on airex and A/P weight shifts 5 10s              Therapeutic Exercise and NMR EXR  [x] (85450) Provided verbal/tactile cueing for activities related to strengthening, flexibility, endurance, ROM for improvements in LE, proximal hip, and core control with self care, mobility, lifting, ambulation.  [] (87110) Provided verbal/tactile cueing for activities related to improving balance, coordination, kinesthetic sense, posture, motor skill, proprioception  to assist with LE, proximal hip, and core control in self care, mobility, lifting, ambulation and eccentric single leg control.      NMR and Therapeutic Activities:    [x] (15441 or 27268) Provided verbal/tactile cueing for activities related to improving balance, coordination, kinesthetic sense, posture, motor skill, proprioception and motor activation to allow for proper function of core, proximal hip and LE with self care and ADLs  [] (43916) Gait Re-education- Provided training and instruction to the patient for proper LE, core and proximal hip recruitment and positioning and eccentric body weight control with ambulation re-education including up and down stairs     Home Exercise Program:    [x] (82819) Reviewed/Progressed HEP activities related to strengthening, flexibility, endurance, ROM of core, proximal hip and LE for functional self-care, mobility, lifting and ambulation/stair navigation   [] (54327)Reviewed/Progressed HEP activities related to improving balance, coordination, kinesthetic sense, posture, motor skill, proprioception of core, proximal hip and LE for self care, mobility, lifting, and ambulation/stair navigation      Manual Treatments:  PROM / STM / Oscillations-Mobs:  G-I, II, III, IV (PA's, Inf., Post.)  [x] (43194) Provided manual therapy to mobilize LE, proximal hip and/or LS spine soft tissue/joints for the purpose of modulating pain, promoting relaxation,  increasing ROM, reducing/eliminating soft tissue swelling/inflammation/restriction, improving soft tissue extensibility and allowing for proper ROM for normal function with self care, mobility, lifting and ambulation. Modalities:COLD PACK 10 minutes on R knee at end of session    Charges:   Timed Code Treatment Minutes: 44   Total Treatment Minutes: 54     [] EVAL (LOW) 54057 (typically 20 minutes face-to-face)  [] EVAL (MOD) 82170 (typically 30 minutes face-to-face)  [] EVAL (HIGH) 62189 (typically 45 minutes face-to-face)  [] RE-EVAL     [x] CS(10332) x  2 KX  [] IONTO  [] NMR (08275) x      [] VASO  [x] Manual (84096) x  1  KX [] Other:  [] TA x       [] Mech Traction (51107)  [] ES(attended) (66523)      [] ES (un) (48136):     GOALS:     Patient stated goal: less pain and movement    Therapist goals for Patient:   Short Term Goals: To be achieved in: 2 weeks  1. Independent in HEP and progression per patient tolerance, in order to prevent re-injury. Met  2. Patient will have a decrease in pain to facilitate improvement in movement, function, and ADLs as indicated by Functional Deficits. Met    Long Term Goals: To be achieved in: 6 weeks  1. Disability index score of 30% or less for the LEFS to assist with reaching prior level of function. LEFS 38.75% (IE 60%)  2. Patient will demonstrate increased AROM to symmetrical to allow for proper joint functioning as indicated by patients Functional Deficits. - 75% MET  3. Patient will demonstrate an increase in Strength to good proximal hip strength and control, within 5lb HHD in LE to allow for proper functional mobility as indicated by patients Functional Deficits. - 75% MET  4. Patient will return to daily increased physical and functional activities without increased symptoms or restriction. - 75% MET    Progression Towards Functional goals:  [x] Patient is progressing as expected towards functional goals listed. [] Progression is slowed due to complexities listed.   [] Progression has been slowed due to co-morbidities. [] Plan just implemented, too soon to assess goals progression  [] Other:     ASSESSMENT:  Patient has been responding well to [facility] and stretching with improvement in knee symptoms at conclusion. Continued deficits in RLE functional strength, especially eccentrically.  Activity tolerance has improved     Return to Play: (if applicable)   []  Stage 1: Intro to Strength   []  Stage 2: Return to Run and Strength   []  Stage 3: Return to Jump and Strength   []  Stage 4: Dynamic Strength and Agility   []  Stage 5: Sport Specific Training     []  Ready to Return to Play, Meets All Above Stages   []  Not Ready for Return to Sports   Comments:                         Treatment/Activity Tolerance:  [x] Patient tolerated treatment well [] Patient limited by fatique  [] Patient limited by pain  [] Patient limited by other medical complications  [] Other:     Prognosis: [x] Good [] Fair  [] Poor    Patient Requires Follow-up: [x] Yes  [] No      PLAN:   [x] Continue per plan of care [] Alter current plan (see comments)  [] Plan of care initiated [] Hold pending MD visit [] Discharge    Electronically signed by: Ana Rosa York PT

## 2017-12-20 ENCOUNTER — HOSPITAL ENCOUNTER (OUTPATIENT)
Dept: PHYSICAL THERAPY | Age: 82
Discharge: HOME | End: 2017-12-20
Admitting: ORTHOPAEDIC SURGERY

## 2017-12-20 NOTE — PLAN OF CARE
[address]        Physical Therapy Re-Certification Plan of Roni Velez      Dear  Dr. Angelina Ellington,    We had the pleasure of treating the following patient for physical therapy services at [address] [place] [address]. A summary of our findings can be found in the updated assessment below. This includes our plan of care. If you have any questions or concerns regarding these findings, please do not hesitate to contact me at the office phone number checked above.   Thank you for the referral.     Physician Signature:________________________________Date:__________________  By signing above (or electronic signature), therapists plan is approved by physician    Date Range Of Visits: 17 - 17  Total Visits to Date: 45  Overall Response to Treatment:   [x]Patient is responding well to treatment and improvement is noted with regards  to goals   []Patient should continue to improve in reasonable time if they continue HEP   []Patient has plateaued and is no longer responding to skilled PT intervention    []Patient is getting worse and would benefit from return to referring MD   []Patient unable to adhere to initial POC   []Other:         Physical Therapy Daily Treatment Note      Date:  2017    Patient Name:  Richard Ronquillo  \"MARY KATE\" :  9/3/1928  MRN: [id number]  Restrictions/Precautions:    Medical/Treatment Diagnosis Information:  Diagnosis: chronic R knee pain  Treatment Diagnosis: Chronic Pain of Right knee M25.561, N56.75  Insurance/Certification information:  PT Insurance Information: Sherry Abebe/Medicare  Physician Information:  Referring Practitioner: Dr. Titi Riddle of care signed (Y/N):     Date of Patient follow up with Physician:  Shashank Vital    G-Code (if applicable):      Date G-Code Applied:  17  PT G-Codes  Functional Assessment Tool Used: LEFS  Score: 52.5% impairment  Functional Limitation: Mobility: Walking and moving around  Mobility: Walking and Moving Around Current Status (): At least 40 percent but less than 60 percent impaired, limited or restricted  Mobility: Walking and Moving Around Goal Status (): At least 20 percent but less than 40 percent impaired, limited or restricted    Progress Note: [x]  Yes  []  No  Next due by: Visit #40       Latex Allergy:  [x]NO      []YES  Preferred Language for Healthcare:   [x]English       []other:     Visit # Insurance Allowable   38  (KX) MCR        Pain: 0-1/10    Subjective: Patient denies any worsening pain since last treatment session. States he has been complying to HEP and continues to show good activity tolerance especially with prolonged walking, stairs and getting in/out car. Objective:      RESTRICTIONS/PRECAUTIONS: R knee OA, weak R glute, R ITB syndrome    Exercises/Interventions:     Therapeutic Ex x 30 min Sets/sec Reps Notes   BIKE 5 min     SAQ 5s 20 4# full roll   SLR 2 10 0#   TKE 5 20 40#   SC March 1 10 4 way, UE support   BOSU lunge 10 10 Fwd/lateral   Slide Lunge 2 10 Retro at counter   Leg Press - DL 3 10 80#   Cybex HS curl 2 12 25#   Glute side walks/ standing glute kicks 2 10 maroon   Calf stretch/HS stretch 30 3 slantboard/seated         Manual Intervention x 14 min      Knee mobs/PROM      Tib/Fem Mobs 8 min  Flex/ext   Patella Mobs 2 min     STM - manual 0 min  R quad/lateral quad   IT band mobs 0 min           NMR re-education x 0 min      [place]/Biofeedback 10/10      G. Med activation/sidelying      G. Max Activation/prone      Hip Ext full ROM G.  Activation      Bosu Bal and Prop- G Med      Single leg stance/Balance/Prop 5 0s Tandem stance with SBA    Bosu Retro G. Med act      Static EC on airex and A/P weight shifts 5 10s              Therapeutic Exercise and NMR EXR  [x] (23475) Provided verbal/tactile cueing for activities related to strengthening, flexibility, endurance, ROM for improvements in LE, proximal hip, and core

## 2018-01-01 ENCOUNTER — HOSPITAL ENCOUNTER (OUTPATIENT)
Dept: OTHER | Age: 83
Discharge: HOME | End: 2018-01-31
Attending: ORTHOPAEDIC SURGERY | Admitting: ORTHOPAEDIC SURGERY

## 2018-01-03 ENCOUNTER — HOSPITAL ENCOUNTER (OUTPATIENT)
Dept: PHYSICAL THERAPY | Age: 83
Discharge: HOME | End: 2018-01-03
Admitting: ORTHOPAEDIC SURGERY

## 2018-01-03 NOTE — FLOWSHEET NOTE
[facility], [place]        Physical Therapy Daily Treatment Note      Date:  2017    Patient Name:  Christine York  [facility]" :  9/3/1928  MRN: [id number]  Restrictions/Precautions:    Medical/Treatment Diagnosis Information:  Diagnosis: chronic R knee pain  Treatment Diagnosis: Chronic Pain of Right knee M25.561, T18.03  Insurance/Certification information:  PT Insurance Information: [address] [place]/Medicare  Physician Information:  Referring Practitioner: Dr. Lomas Service of care signed (Y/N):     Date of Patient follow up with Physician:  Louise Camacho    G-Code (if applicable):      Date G-Code Applied:  17  PT G-Codes  Functional Assessment Tool Used: LEFS  Score: 52.5% impairment  Functional Limitation: Mobility: Walking and moving around  Mobility: Walking and Moving Around Current Status (): At least 40 percent but less than 60 percent impaired, limited or restricted  Mobility: Walking and Moving Around Goal Status (): At least 20 percent but less than 40 percent impaired, limited or restricted    Progress Note: [x]  Yes  []  No  Next due by: Visit #40       Latex Allergy:  [x]NO      []YES  Preferred Language for Healthcare:   [x]English       []other:     Visit # Insurance Allowable   39  (KX) MCR        Pain: 0-1/10    Subjective: Patient reports that his strength and function are beginning to improve. No complaints of pain entering PT today, mild stiffness.       Objective:      RESTRICTIONS/PRECAUTIONS: R knee OA, weak R glute, R ITB syndrome    Exercises/Interventions:     Therapeutic Ex x 30 min Sets/sec Reps Notes   BIKE 5 min     SAQ 5s 20 4# full roll   SLR 2 10 0#   TKE 5 20 40#   SC  10 4 way, UE support   BOSU lunge 10 10 Fwd/lateral   Slide Lunge 2 10 Retro at counter   Leg Press - DL 3 10 80#   Cybex HS curl 2 12 25#   Glute side walks/ standing glute kicks 2 10 maroon   Calf stretch/HS stretch 30 3 slantboard/seated

## 2018-01-17 ENCOUNTER — HOSPITAL ENCOUNTER (OUTPATIENT)
Dept: PHYSICAL THERAPY | Age: 83
Discharge: HOME | End: 2018-01-17
Admitting: ORTHOPAEDIC SURGERY

## 2018-01-17 NOTE — FLOWSHEET NOTE
activities related to improving balance, coordination, kinesthetic sense, posture, motor skill, proprioception of core, proximal hip and LE for self care, mobility, lifting, and ambulation/stair navigation      Manual Treatments:  PROM / STM / Oscillations-Mobs:  G-I, II, III, IV (PA's, Inf., Post.)  [x] (42873) Provided manual therapy to mobilize LE, proximal hip and/or LS spine soft tissue/joints for the purpose of modulating pain, promoting relaxation,  increasing ROM, reducing/eliminating soft tissue swelling/inflammation/restriction, improving soft tissue extensibility and allowing for proper ROM for normal function with self care, mobility, lifting and ambulation. Modalities:COLD PACK 10 minutes on R knee at end of session    Charges:   Timed Code Treatment Minutes: 50   Total Treatment Minutes: 60     [] EVAL (LOW) 64986 (typically 20 minutes face-to-face)  [] EVAL (MOD) 21853 (typically 30 minutes face-to-face)  [] EVAL (HIGH) 98455 (typically 45 minutes face-to-face)  [] RE-EVAL     [x] JL(00517) x  2 KX  [] IONTO  [] NMR (67978) x      [] VASO  [x] Manual (22888) x  1  KX [] Other:  [] TA x       [] Mech Traction (00063)  [] ES(attended) (02784)      [] ES (un) (94281):     GOALS:     Patient stated goal: less pain and movement    Therapist goals for Patient:   Short Term Goals: To be achieved in: 2 weeks  1. Independent in HEP and progression per patient tolerance, in order to prevent re-injury. Met  2. Patient will have a decrease in pain to facilitate improvement in movement, function, and ADLs as indicated by Functional Deficits. Met    Long Term Goals: To be achieved in: 6 weeks  1. Disability index score of 30% or less for the LEFS to assist with reaching prior level of function. LEFS 38.75% (IE 60%)  2. Patient will demonstrate increased AROM to symmetrical to allow for proper joint functioning as indicated by patients Functional Deficits. - 75% MET  3.  Patient will demonstrate an increase in Strength to good proximal hip strength and control, within 5lb HHD in LE to allow for proper functional mobility as indicated by patients Functional Deficits. - 75% MET  4. Patient will return to daily increased physical and functional activities without increased symptoms or restriction. - 75% MET    Progression Towards Functional goals:  [x] Patient is progressing as expected towards functional goals listed. [] Progression is slowed due to complexities listed. [] Progression has been slowed due to co-morbidities. [] Plan just implemented, too soon to assess goals progression  [] Other:     ASSESSMENT:  Patient initially guarded with passive joint mobilization and gentle stretching. Knee range and flexibility as improved from start of manual treatment and at conclusion per pt reports 'I couldn't do that before I started coming here'. Activity tolerance is improving each date and  continued deficits in RLE functional strength, especially eccentrically noted requiring ongoing skilled physical therapy intervention for strength and quad activation.     Return to Play: (if applicable)   []  Stage 1: Intro to Strength   []  Stage 2: Return to Run and Strength   []  Stage 3: Return to Jump and Strength   []  Stage 4: Dynamic Strength and Agility   []  Stage 5: Sport Specific Training     []  Ready to Return to Play, Meets All Above Stages   []  Not Ready for Return to Sports   Comments:                         Treatment/Activity Tolerance:  [x] Patient tolerated treatment well [] Patient limited by fatique  [] Patient limited by pain  [] Patient limited by other medical complications  [] Other:     Prognosis: [x] Good [] Fair  [] Poor    Patient Requires Follow-up: [x] Yes  [] No      PLAN:   [x] Continue per plan of care [] Alter current plan (see comments)  [] Plan of care initiated [] Hold pending MD visit [] Discharge    Electronically signed by: Greg Jaimes PTA

## 2018-02-01 ENCOUNTER — HOSPITAL ENCOUNTER (OUTPATIENT)
Dept: OTHER | Age: 83
Discharge: HOME | End: 2018-02-28
Attending: ORTHOPAEDIC SURGERY | Admitting: ORTHOPAEDIC SURGERY

## 2018-02-06 ENCOUNTER — OFFICE VISIT (OUTPATIENT)
Dept: CARDIOLOGY CLINIC | Age: 83
End: 2018-02-06

## 2018-02-06 VITALS
WEIGHT: 213 LBS | HEIGHT: 69 IN | SYSTOLIC BLOOD PRESSURE: 116 MMHG | BODY MASS INDEX: 31.55 KG/M2 | HEART RATE: 72 BPM | DIASTOLIC BLOOD PRESSURE: 76 MMHG

## 2018-02-06 DIAGNOSIS — R06.02 SHORTNESS OF BREATH: ICD-10-CM

## 2018-02-06 DIAGNOSIS — R07.9 CHEST PAIN IN ADULT: Primary | ICD-10-CM

## 2018-02-06 PROCEDURE — 99214 OFFICE O/P EST MOD 30 MIN: CPT | Performed by: INTERNAL MEDICINE

## 2018-02-06 PROCEDURE — 1036F TOBACCO NON-USER: CPT | Performed by: INTERNAL MEDICINE

## 2018-02-06 PROCEDURE — G8427 DOCREV CUR MEDS BY ELIG CLIN: HCPCS | Performed by: INTERNAL MEDICINE

## 2018-02-06 PROCEDURE — G8484 FLU IMMUNIZE NO ADMIN: HCPCS | Performed by: INTERNAL MEDICINE

## 2018-02-06 PROCEDURE — 4040F PNEUMOC VAC/ADMIN/RCVD: CPT | Performed by: INTERNAL MEDICINE

## 2018-02-06 PROCEDURE — G8417 CALC BMI ABV UP PARAM F/U: HCPCS | Performed by: INTERNAL MEDICINE

## 2018-02-06 PROCEDURE — 93000 ELECTROCARDIOGRAM COMPLETE: CPT | Performed by: INTERNAL MEDICINE

## 2018-02-06 PROCEDURE — 1123F ACP DISCUSS/DSCN MKR DOCD: CPT | Performed by: INTERNAL MEDICINE

## 2018-02-06 RX ORDER — AMLODIPINE BESYLATE 5 MG/1
5 TABLET ORAL DAILY
Qty: 90 TABLET | Refills: 3 | Status: ON HOLD | OUTPATIENT
Start: 2018-02-06 | End: 2018-05-06 | Stop reason: HOSPADM

## 2018-02-06 RX ORDER — AMLODIPINE BESYLATE 5 MG/1
5 TABLET ORAL DAILY
Qty: 90 TABLET | Refills: 3 | Status: CANCELLED | OUTPATIENT
Start: 2018-02-06

## 2018-02-06 NOTE — LETTER
exertional shortness of breath. He was evaluated by Dr Dwayne Trujillo, whom thought that he was not a candidate for peripheral surgery, because of the need for knee replacements as well. He is going to PT, which he thinks is helping his orthopedic pain. He used to get awakened with knee pain at night, but this has subsided. Describes the chest pain episodes have increased to 3-4 times a week although he can exercise and due physical therapy without problems. NTG helps pain within 2-3 minutes . Med changes with last ov have not helped. Past Medical History: Active Ambulatory Problems     Diagnosis Date Noted    Hyperlipidemia 06/14/2011    Carotid artery disease 06/14/2011    Gout attack 12/02/2012    HTN (hypertension) 12/02/2012    Knee osteoarthritis 04/02/2014    Osteoarthrosis involving lower leg 08/12/2015    PAD (peripheral artery disease) 10/09/2015    Precordial pain 11/24/2015    Localized osteoarthrosis, lower leg 12/16/2015    Primary osteoarthritis of both knees 08/17/2016    Primary osteoarthritis of knee 03/15/2017     Resolved Ambulatory Problems     Diagnosis Date Noted    HTN (hypertension) 06/14/2011    Other and unspecified hyperlipidemia 06/21/2011    Essential hypertension, benign 06/21/2011    Peripheral vascular disease ([facility] 06/21/2011    Tachycardia 06/21/2011    Shortness of breath 06/21/2011    Other chest pain 06/21/2011     Past Medical History:   Diagnosis Date    Arrhythmia     Arthritis     Cancer ([address]     Hyperlipidemia     Hypertension     Macular degeneration of right eye     PVD (peripheral vascular disease) ([facility]     Sleep apnea         Surgical History: has a past surgical history that includes Carotid endarterectomy; Rotator cuff repair; Subclavian Bypass Graft; Knee arthroscopy; Tonsillectomy; eye surgery; Femoral-femoral Bypass Graft (1979); Carpal tunnel release (8-); IR Femoral Popliteal Bypass Graft (11/29/12); and Bladder surgery.

## 2018-02-09 NOTE — COMMUNICATION BODY
· Constitutional: there has been no unanticipated weight loss. There's been no change in energy level, sleep pattern, or activity level. · Eyes: No visual changes or diplopia. No scleral icterus. · ENT: No Headaches, hearing loss or vertigo. No mouth sores or sore throat. · Cardiovascular: Reviewed in HPI   · Respiratory: No cough or wheezing, no sputum production. No hematemesis. · Gastrointestinal: No abdominal pain, appetite loss, blood in stools. No change in bowel or bladder habits. · Genitourinary: No dysuria, trouble voiding, or hematuria. · Musculoskeletal:  No gait disturbance, complain of joint pain  · Integumentary: No rash or pruritis. · Neurological: No headache, diplopia, change in muscle strength, numbness or tingling. No change in gait, balance, coordination, mood, affect, memory, mentation, behavior. · Psychiatric: No anxiety, no depression. · Endocrine: No malaise, fatigue or temperature intolerance. No excessive thirst, fluid intake, or urination. No tremor. · Hematologic/Lymphatic: No abnormal bruising or bleeding, blood clots or swollen lymph nodes. · Allergic/Immunologic: No nasal congestion or hives. Physical Examination:    Vitals:    02/06/18 1124   BP: 116/76   Pulse: 72        Constitutional and General Appearance: pleasant gentleman, appears younger than his stated age  Skin:good turgor,intact without lesions  HEENT: EOMI ,normal  Neck:no JVD    Respiratory:  · Normal excursion and expansion without use of accessory muscles  · Resp Auscultation: Normal breath sounds without dullness  Cardiovascular:  · The apical impulses not displaced  · Heart tones are crisp and normal  · Cervical veins are not engorged  · The carotid upstroke is normal in amplitude and contour without delay +bruit  · Regular rate and rhythm without murmurs gallops or rubs  · Peripheral pulses are symmetrical, diminished  · There is no clubbing, cyanosis of the extremities.   · Slight endarterectomy. Left subclavian bypass was occluded. By ultrasound September, 2012 the right carotid 16-49% narrowing, Normal left carotid. Carotids 11/2015: <87% JANNETTE, 42-02% LICA  Will be repeating later this month  1/6/17 carotid ultrasound--less than 50% bilaterally  Will be followed by vascular surgeon  Problem:  Chest pain- continues  Increase imdur to 120 mg daily and changes in amlodipine do not help    EKG interpreted by me in office today sinus rhythm,CRBBB -2/6/18  Plan:  1. Will schedule Lexiscan stress , echo in [place] next week when I am in office to evaluate dyspnea and chest pain    Be Lopez MD, [facility]

## 2018-02-16 ENCOUNTER — HOSPITAL ENCOUNTER (OUTPATIENT)
Dept: NON INVASIVE DIAGNOSTICS | Age: 83
Discharge: HOME | End: 2018-02-16
Attending: INTERNAL MEDICINE | Admitting: INTERNAL MEDICINE

## 2018-02-16 DIAGNOSIS — R07.9 CHEST PAIN: ICD-10-CM

## 2018-02-16 LAB
LV EF: 43 %
LV EF: 60 %
LVEF MODALITY: NORMAL
LVEF MODALITY: NORMAL

## 2018-02-16 RX ORDER — RANOLAZINE 500 MG/1
500 TABLET, EXTENDED RELEASE ORAL 2 TIMES DAILY
Qty: 180 TABLET | Refills: 3 | Status: SHIPPED | OUTPATIENT
Start: 2018-02-16

## 2018-02-16 NOTE — PROGRESS NOTES
Instructed on Lexiscan Stress Test Procedure including possible side effects/ adverse reactions. Patient verbalizes  understanding and denies having any questions. See [facility] Cardiology         Lynda Doran RN

## 2018-02-21 ENCOUNTER — HOSPITAL ENCOUNTER (OUTPATIENT)
Dept: PHYSICAL THERAPY | Age: 83
Discharge: HOME | End: 2018-02-22
Admitting: ORTHOPAEDIC SURGERY

## 2018-02-28 ENCOUNTER — HOSPITAL ENCOUNTER (OUTPATIENT)
Dept: PHYSICAL THERAPY | Age: 83
Discharge: HOME | End: 2018-03-01
Admitting: ORTHOPAEDIC SURGERY

## 2018-02-28 NOTE — FLOWSHEET NOTE
[facility]  Orthopaedics and Sports Rehabilitation, [place]                          Physical Therapy Daily Treatment Note      Date:  2018    Patient Name:  Ishan [facility]" :  9/3/1928  MRN: [id number]  Restrictions/Precautions:    Medical/Treatment Diagnosis Information:  Diagnosis: chronic R knee pain  Treatment Diagnosis: Chronic Pain of Right knee M25.561, I33.17  Insurance/Certification information:  PT Insurance Information: Lis[employer] Guthrie/Medicare  Physician Information:  Referring Practitioner: Dr. Darden Files of care signed (Y/N):     Date of Patient follow up with Physician:  Margret Hawkins    G-Code (if applicable):      Date G-Code Applied:  17  PT G-Codes  Functional Assessment Tool Used: LEFS  Score: 52.5% impairment  Functional Limitation: Mobility: Walking and moving around  Mobility: Walking and Moving Around Current Status (): At least 40 percent but less than 60 percent impaired, limited or restricted  Mobility: Walking and Moving Around Goal Status (): At least 20 percent but less than 40 percent impaired, limited or restricted    Progress Note: [x]  Yes  []  No  Next due by: Visit #52       Latex Allergy:  [x]NO      []YES  Preferred Language for Healthcare:   [x]English       []other:     Visit # Insurance Allowable   43 (5 for 2018) MCR        Pain:  0/10    Subjective: Patient reports that he feels a little bit better today, knee only woke him up 1x last night.       Objective:      RESTRICTIONS/PRECAUTIONS: R knee OA, weak R glute, R ITB syndrome    Objective:            Exercises/Interventions:     Therapeutic Ex x 22 min Sets/sec Reps Notes   BIKE 5 min     SAQ 5s 20 5# full roll   SLR 2 10 0#   TKE 5 20 40#   SC March 1 10 4 way, UE support   BOSU lunge 10 10 Fwd/lateral   Slide Lunge 2 10 Retro at counter   Leg Press - DL 3 10 80#   Cybex HS curl 2 12 25#   Glute side walks/ standing glute kicks 2 10 maroon   Calf stretch/HS stretch 30 3 slantboard/seated         Manual Intervention x 10 min      Knee mobs/PROM      Tib/Fem Mobs 8 min  Flex/ext   Patella Mobs 2 min     STM - manual 4 min  R quad/lateral quad   IT band mobs 0 min           NMR re-education x 12 min      Spanish/Biofeedback 10/10      G. Med activation/sidelying      G. Max Activation/prone      Hip Ext full ROM G. Activation      Bosu Bal and Prop- G Med      Single leg stance/Balance/Prop Tandem stance with SBA    Bosu Retro G. Med act      Static EC on airex and A/P weight shifts; SLS and marching 5 10s marching             Therapeutic Exercise and NMR EXR  [x] (09880) Provided verbal/tactile cueing for activities related to strengthening, flexibility, endurance, ROM for improvements in LE, proximal hip, and core control with self care, mobility, lifting, ambulation.  [] (68627) Provided verbal/tactile cueing for activities related to improving balance, coordination, kinesthetic sense, posture, motor skill, proprioception  to assist with LE, proximal hip, and core control in self care, mobility, lifting, ambulation and eccentric single leg control.      NMR and Therapeutic Activities:    [x] (74852 or 77016) Provided verbal/tactile cueing for activities related to improving balance, coordination, kinesthetic sense, posture, motor skill, proprioception and motor activation to allow for proper function of core, proximal hip and LE with self care and ADLs  [] (49080) Gait Re-education- Provided training and instruction to the patient for proper LE, core and proximal hip recruitment and positioning and eccentric body weight control with ambulation re-education including up and down stairs     Home Exercise Program:    [x] (12072) Reviewed/Progressed HEP activities related to strengthening, flexibility, endurance, ROM of core, proximal hip and LE for functional self-care, mobility, lifting and ambulation/stair navigation   [] (95190)Reviewed/Progressed HEP activities related to improving balance, coordination, kinesthetic sense, posture, motor skill, proprioception of core, proximal hip and LE for self care, mobility, lifting, and ambulation/stair navigation      Manual Treatments:  PROM / STM / Oscillations-Mobs:  G-I, II, III, IV (PA's, Inf., Post.)  [x] (84021) Provided manual therapy to mobilize LE, proximal hip and/or LS spine soft tissue/joints for the purpose of modulating pain, promoting relaxation,  increasing ROM, reducing/eliminating soft tissue swelling/inflammation/restriction, improving soft tissue extensibility and allowing for proper ROM for normal function with self care, mobility, lifting and ambulation. Modalities: ice x 10' at conclusion     Charges:   Timed Code Treatment Minutes: 44   Total Treatment Minutes: 46     [] EVAL (LOW) 82702 (typically 20 minutes face-to-face)  [] EVAL (MOD) 46993 (typically 30 minutes face-to-face)  [] EVAL (HIGH) 68441 (typically 45 minutes face-to-face)  [] RE-EVAL     [x] FD(36577) x  1      [] IONTO  [x] NMR (74883) x  1   [] VASO  [x] Manual (20053) x  1      [] Other:  [] TA x       [] Mech Traction (61773)  [] ES(attended) (07224)      [] ES (un) (02662):     GOALS:     Patient stated goal: less pain and movement    Therapist goals for Patient:   Short Term Goals: To be achieved in: 2 weeks  1. Independent in HEP and progression per patient tolerance, in order to prevent re-injury. Met  2. Patient will have a decrease in pain to facilitate improvement in movement, function, and ADLs as indicated by Functional Deficits. Met    Long Term Goals: To be achieved in: 6 weeks  1. Disability index score of 30% or less for the LEFS to assist with reaching prior level of function. LEFS 51% (IE 60%)  2. Patient will demonstrate increased AROM to symmetrical to allow for proper joint functioning as indicated by patients Functional Deficits. - 90% MET  3.  Patient will demonstrate an increase in Strength to good proximal hip strength and control, within

## 2018-03-01 ENCOUNTER — HOSPITAL ENCOUNTER (OUTPATIENT)
Dept: OTHER | Age: 83
Discharge: HOME | End: 2018-03-31
Attending: ORTHOPAEDIC SURGERY | Admitting: ORTHOPAEDIC SURGERY

## 2018-03-07 ENCOUNTER — HOSPITAL ENCOUNTER (OUTPATIENT)
Dept: PHYSICAL THERAPY | Age: 83
Discharge: HOME | End: 2018-03-08
Admitting: ORTHOPAEDIC SURGERY

## 2018-03-07 NOTE — FLOWSHEET NOTE
MET  3. Patient will demonstrate an increase in Strength to good proximal hip strength and control, within 5lb HHD in LE to allow for proper functional mobility as indicated by patients Functional Deficits. - 75% MET  4. Patient will return to daily increased physical and functional activities without increased symptoms or restriction. - 75% MET    Progression Towards Functional goals:  [x] Patient is progressing as expected towards functional goals listed. [] Progression is slowed due to complexities listed. [] Progression has been slowed due to co-morbidities. [] Plan just implemented, too soon to assess goals progression  [] Other:     ASSESSMENT:  Patient did well with IASTM to knee and strengthening exercises today. Increased resistance with SLR today. Patient tolerated all strengthening well; game ready for ice to knee at conclusion x 10 min. Return to Play: (if applicable)   []  Stage 1: Intro to Strength   []  Stage 2: Return to Run and Strength   []  Stage 3: Return to Jump and Strength   []  Stage 4: Dynamic Strength and Agility   []  Stage 5: Sport Specific Training     []  Ready to Return to Play, Meets All Above Stages   []  Not Ready for Return to Sports   Comments:                         Treatment/Activity Tolerance:  [x] Patient tolerated treatment well [] Patient limited by fatique  [] Patient limited by pain  [] Patient limited by other medical complications  [] Other:     Prognosis: [x] Good [] Fair  [] Poor    Patient Requires Follow-up: [x] Yes  [] No      PLAN: Continue with POC, mild decline in function since last progress note; recommend resumption of 1x week of therapy with taping to every other week as appropriate.    [x] Continue per plan of care [] Alter current plan (see comments)  [] Plan of care initiated [] Hold pending MD visit [] Discharge    Electronically signed by: Ernesto Humphries DPT , [id number]

## 2018-03-13 ENCOUNTER — OFFICE VISIT (OUTPATIENT)
Dept: CARDIOLOGY CLINIC | Age: 83
End: 2018-03-13

## 2018-03-13 ENCOUNTER — TELEPHONE (OUTPATIENT)
Dept: CARDIOLOGY CLINIC | Age: 83
End: 2018-03-13

## 2018-03-13 ENCOUNTER — HOSPITAL ENCOUNTER (OUTPATIENT)
Dept: PHYSICAL THERAPY | Age: 83
Discharge: HOME | End: 2018-03-14
Admitting: ORTHOPAEDIC SURGERY

## 2018-03-13 VITALS
WEIGHT: 210 LBS | BODY MASS INDEX: 31.1 KG/M2 | SYSTOLIC BLOOD PRESSURE: 118 MMHG | DIASTOLIC BLOOD PRESSURE: 72 MMHG | HEART RATE: 60 BPM | HEIGHT: 69 IN

## 2018-03-13 DIAGNOSIS — I10 ESSENTIAL HYPERTENSION: Chronic | ICD-10-CM

## 2018-03-13 DIAGNOSIS — E78.5 HYPERLIPIDEMIA, UNSPECIFIED HYPERLIPIDEMIA TYPE: ICD-10-CM

## 2018-03-13 DIAGNOSIS — R07.2 PRECORDIAL PAIN: Primary | ICD-10-CM

## 2018-03-13 DIAGNOSIS — I73.9 PAD (PERIPHERAL ARTERY DISEASE) (HCC): ICD-10-CM

## 2018-03-13 DIAGNOSIS — I77.9 BILATERAL CAROTID ARTERY DISEASE (HCC): Chronic | ICD-10-CM

## 2018-03-13 PROCEDURE — 1123F ACP DISCUSS/DSCN MKR DOCD: CPT | Performed by: INTERNAL MEDICINE

## 2018-03-13 PROCEDURE — 4040F PNEUMOC VAC/ADMIN/RCVD: CPT | Performed by: INTERNAL MEDICINE

## 2018-03-13 PROCEDURE — 99214 OFFICE O/P EST MOD 30 MIN: CPT | Performed by: INTERNAL MEDICINE

## 2018-03-13 PROCEDURE — G8417 CALC BMI ABV UP PARAM F/U: HCPCS | Performed by: INTERNAL MEDICINE

## 2018-03-13 PROCEDURE — 1036F TOBACCO NON-USER: CPT | Performed by: INTERNAL MEDICINE

## 2018-03-13 PROCEDURE — G8484 FLU IMMUNIZE NO ADMIN: HCPCS | Performed by: INTERNAL MEDICINE

## 2018-03-13 PROCEDURE — G8427 DOCREV CUR MEDS BY ELIG CLIN: HCPCS | Performed by: INTERNAL MEDICINE

## 2018-03-13 NOTE — PROGRESS NOTES
Cardiac Follow up    Referring Provider:  Jeff Gomez MD     Chief Complaint   Patient presents with    Hyperlipidemia     No cardiac complaints       History of Present Illness:  Mr. Alondra Gonzalez is an 80 y.o. gentleman here today for a follow up visit. He has a history of peripheral vascular disease, hypertension and hyperlipidemia. He has had carotid surgery in the past. He has seen Dr. Magnus Riggs for evaluation of his peripheral vascular disease. A CTA was obtained following noninvasive   evaluation which revealed evidence of severe arterial insufficiency at rest in the left lower extremity and mild insufficiency in the right lower extremity. The CT angiogram revealed bilateral SFA occlusions and focal moderate high-grade stenosis within the distal left external iliac artery and a 50% calcified plaque at the origin of the SMA. He has been seen Dr. Crow Lopez. He was planning to have knee surgery but was told by Dr. Sierra Else not to have the knee surgery with his arterial disease and likely difficult time healing. He was previously taken off of plavix,due to hematuria, followed by bladder tumor extraction. He has not had any hematuria since    Had recent chest pain evaluation with lexiscan with myoview. Ranexa started    Today he is seen post gxt and echo 2/16/18. Sts the Ranexa has helped his chest pain symptoms although he is concerned about the cost    Past Medical History:    Active Ambulatory Problems     Diagnosis Date Noted    Hyperlipidemia 06/14/2011    Carotid artery disease 06/14/2011    Gout attack 12/02/2012    HTN (hypertension) 12/02/2012    Knee osteoarthritis 04/02/2014    Osteoarthrosis involving lower leg 08/12/2015    PAD (peripheral artery disease) 10/09/2015    Precordial pain 11/24/2015    Localized osteoarthrosis, lower leg 12/16/2015    Primary osteoarthritis of both knees 08/17/2016    Primary osteoarthritis of knee 03/15/2017     Resolved Ambulatory Problems Diagnosis Date Noted    HTN (hypertension) 06/14/2011    Other and unspecified hyperlipidemia 06/21/2011    Essential hypertension, benign 06/21/2011    Peripheral vascular disease ([facility] [address] 06/21/2011    Tachycardia 06/21/2011    Shortness of breath 06/21/2011    Other chest pain 06/21/2011     Past Medical History:   Diagnosis Date    Arrhythmia     Arthritis     Cancer ([facility] [address]     Hyperlipidemia     Hypertension     Macular degeneration of right eye     PVD (peripheral vascular disease) ([facility] [address]     Sleep apnea         Surgical History: has a past surgical history that includes Carotid endarterectomy; Rotator cuff repair; Subclavian Bypass Graft; Knee arthroscopy; Tonsillectomy; eye surgery; Femoral-femoral Bypass Graft (1979); Carpal tunnel release (8-); IR Femoral Popliteal Bypass Graft (11/29/12); and Bladder surgery. Social History: reports that he quit smoking about 27 years ago. He quit after 50.00 years of use. He has never used smokeless tobacco. He reports that he drinks about 6.0 oz of alcohol per week . He reports that he does not use drugs. Family History:family history includes Alzheimer's Disease in his mother; Cancer in his father and mother. Allergies:Cephalexin; Naproxen; and Sulfa antibiotics     Review of Systems:   · Constitutional: there has been no unanticipated weight loss. There's been no change in energy level, sleep pattern, or activity level. · Eyes: No visual changes or diplopia. No scleral icterus. · ENT: No Headaches, hearing loss or vertigo. No mouth sores or sore throat. · Cardiovascular: Reviewed in HPI   · Respiratory: No cough or wheezing, no sputum production. No hematemesis. · Gastrointestinal: No abdominal pain, appetite loss, blood in stools. No change in bowel or bladder habits. · Genitourinary: No dysuria, trouble voiding, or hematuria.   · Musculoskeletal:  No gait disturbance, complain of joint pain  · Integumentary: No rash or pruritis. · Neurological: No headache, diplopia, change in muscle strength, numbness or tingling. No change in gait, balance, coordination, mood, affect, memory, mentation, behavior. · Psychiatric: No anxiety, no depression. · Endocrine: No malaise, fatigue or temperature intolerance. No excessive thirst, fluid intake, or urination. No tremor. · Hematologic/Lymphatic: No abnormal bruising or bleeding, blood clots or swollen lymph nodes. · Allergic/Immunologic: No nasal congestion or hives. Physical Examination:    Vitals:    03/13/18 1243   BP: 118/72   Pulse: 60        Constitutional and General Appearance: pleasant gentleman, appears younger than his stated age  Skin:good turgor,intact without lesions  HEENT: EOMI ,normal  Neck:no JVD    Respiratory:  · Normal excursion and expansion without use of accessory muscles  · Resp Auscultation: Normal breath sounds without dullness  Cardiovascular:  · The apical impulses not displaced  · Heart tones are crisp and normal  · Cervical veins are not engorged  · The carotid upstroke is normal in amplitude and contour without delay +bruit  · Regular rate and rhythm without murmurs gallops or rubs  · Peripheral pulses are symmetrical, diminished  · There is no clubbing, cyanosis of the extremities. · Slight edema  · Peripheral pulses are diminished bilaterally  · There are chronic venous stasis changes of the lower extremities. Abdomen:  · No masses or tenderness  · Liver/Spleen: No Abnormalities Noted  Neurological/Psychiatric:  · Alert and oriented in all spheres  · Moves all extremities well  · Exhibits normal gait balance and coordination  · No abnormalities of mood, affect, memory, mentation, or behavior are noted    EKG 11/23/2015- shows Sinus rhythm with RBBB and left axis bifascicular block. Assessment:   1. Chest pain   2. Essential hypertension   3.  Hyperlipidemia       Problem: Multifocal atrial tachycardia  Modified Rafael stress test October,

## 2018-03-13 NOTE — FLOWSHEET NOTE
self-care, mobility, lifting and ambulation/stair navigation   [] (50188)Reviewed/Progressed HEP activities related to improving balance, coordination, kinesthetic sense, posture, motor skill, proprioception of core, proximal hip and LE for self care, mobility, lifting, and ambulation/stair navigation      Manual Treatments:  PROM / STM / Oscillations-Mobs:  G-I, II, III, IV (PA's, Inf., Post.)  [x] (68510) Provided manual therapy to mobilize LE, proximal hip and/or LS spine soft tissue/joints for the purpose of modulating pain, promoting relaxation,  increasing ROM, reducing/eliminating soft tissue swelling/inflammation/restriction, improving soft tissue extensibility and allowing for proper ROM for normal function with self care, mobility, lifting and ambulation. Modalities: declined ice at conclusion. Charges:   Timed Code Treatment Minutes: 55   Total Treatment Minutes: 58     [] EVAL (LOW) 73664 (typically 20 minutes face-to-face)  [] EVAL (MOD) 90688 (typically 30 minutes face-to-face)  [] EVAL (HIGH) 08284 (typically 45 minutes face-to-face)  [] RE-EVAL     [x] LN(57940) x  2      [] IONTO  [x] NMR (31845) x  1   [] VASO  [x] Manual (01556) x  1      [] Other:  [] TA x       [] Mech Traction (45709)  [] ES(attended) (94307)      [] ES (un) (86835):     GOALS:     Patient stated goal: less pain and movement    Therapist goals for Patient:   Short Term Goals: To be achieved in: 2 weeks  1. Independent in HEP and progression per patient tolerance, in order to prevent re-injury. Met  2. Patient will have a decrease in pain to facilitate improvement in movement, function, and ADLs as indicated by Functional Deficits. Met    Long Term Goals: To be achieved in: 6 weeks  1. Disability index score of 30% or less for the LEFS to assist with reaching prior level of function. LEFS 51% (IE 60%)  2.  Patient will demonstrate increased AROM to symmetrical to allow for proper joint functioning as indicated by patients

## 2018-03-13 NOTE — TELEPHONE ENCOUNTER
I called and LMOVM letting pt know I have four samples of Ranexa available for him. I let him know that the earliest we can get it to [facility] office would be in one week on 3/20/18. I asked for him to call us back to confirm that is okay.        LOT: [id number]   EXP: 09/2020

## 2018-03-13 NOTE — LETTER
Neurological/Psychiatric:  · Alert and oriented in all spheres  · Moves all extremities well  · Exhibits normal gait balance and coordination  · No abnormalities of mood, affect, memory, mentation, or behavior are noted    EKG 11/23/2015- shows Sinus rhythm with RBBB and left axis bifascicular block. Assessment:   1. Chest pain   2. Essential hypertension   3. Hyperlipidemia       Problem: Multifocal atrial tachycardia  Modified Rafael stress test October, 2000  Revealed normal perfusion and left ventricular ejection fraction 68%  No complaints of tachycardia/palpitations today     Problem: PAD/Claudication  Angiography 11/24/15:  A CTA was obtained following noninvasive   evaluation which revealed evidence of severe arterial insufficiency at rest in the left lower extremity and mild insufficiency in the right lower extremity. The CT angiogram revealed bilateral SFA occlusions and focal moderate high-grade stenosis within the distal left external iliac artery and a 50% calcified plaque at the origin of the SMA. He has been seen Dr. Stone Gordillo. He was planning to have knee surgery but was told by Dr. Genell Severe not to have the knee surgery with his arterial disease and likely difficult time healing. 2015 fem to fem bypass by Dr Javier Pierce claudication, never had knee replacement  Vascular surgeon will repeat studies in Jan    Problem: Hyperlipidemia  8/2015: , HDL 52, LDL 59, ; on simvastatin 40 mg daily  Excellent profile  Will repeat    Problem: Hypertension  /72 (Site: Right Arm, Position: Sitting, Cuff Size: Medium Adult)   Pulse 60   Ht 5' 9\" (1.753 m)   Wt 210 lb (95.3 kg)   BMI 31.01 kg/m²    Good BP control    Problem: Carotid artery disease  August 2010 left carotid endarterectomy. Left subclavian bypass was occluded. By ultrasound September, 2012 the right carotid 16-49% narrowing, Normal left carotid.   Carotids 11/2015: <37% JANNETTE, 64-81% LICA  Will be repeating later this month

## 2018-03-15 NOTE — COMMUNICATION BODY
Cardiac Follow up    Referring Provider:  Roberto Pina MD     Chief Complaint   Patient presents with    Hyperlipidemia     No cardiac complaints       History of Present Illness:  Mr. Delmi Presley is an 80 y.o. gentleman here today for a follow up visit. He has a history of peripheral vascular disease, hypertension and hyperlipidemia. He has had carotid surgery in the past. He has seen Dr. Marcel Zaragoza for evaluation of his peripheral vascular disease. A CTA was obtained following noninvasive   evaluation which revealed evidence of severe arterial insufficiency at rest in the left lower extremity and mild insufficiency in the right lower extremity. The CT angiogram revealed bilateral SFA occlusions and focal moderate high-grade stenosis within the distal left external iliac artery and a 50% calcified plaque at the origin of the SMA. He has been seen Dr. Iggy Gurrola. He was planning to have knee surgery but was told by Dr. Tuan Jones not to have the knee surgery with his arterial disease and likely difficult time healing. He was previously taken off of plavix,due to hematuria, followed by bladder tumor extraction. He has not had any hematuria since    Had recent chest pain evaluation with lexiscan with myoview. Ranexa started    Today he is seen post gxt and echo 2/16/18. Sts the Ranexa has helped his chest pain symptoms although he is concerned about the cost    Past Medical History:    Active Ambulatory Problems     Diagnosis Date Noted    Hyperlipidemia 06/14/2011    Carotid artery disease 06/14/2011    Gout attack 12/02/2012    HTN (hypertension) 12/02/2012    Knee osteoarthritis 04/02/2014    Osteoarthrosis involving lower leg 08/12/2015    PAD (peripheral artery disease) 10/09/2015    Precordial pain 11/24/2015    Localized osteoarthrosis, lower leg 12/16/2015    Primary osteoarthritis of both knees 08/17/2016    Primary osteoarthritis of knee 03/15/2017     Resolved Ambulatory Problems

## 2018-03-28 ENCOUNTER — HOSPITAL ENCOUNTER (OUTPATIENT)
Dept: PHYSICAL THERAPY | Age: 83
Discharge: HOME | End: 2018-03-29
Admitting: ORTHOPAEDIC SURGERY

## 2018-03-28 NOTE — FLOWSHEET NOTE
Cybex HS curl 2 12 30#   Glute side walks/ standing glute kicks 2 10 maroon   Calf stretch/HS stretch 30 3 slantboard/seated   Forward step 1 10 6\"   Lateral tap down 1 10 4\"         Manual Intervention x 10 min      Knee mobs/PROM      Tib/Fem Mobs 5 min  Flex/ext   Patella Mobs 3 min     STM - manual 6 min  R quad/lateral quad   IT band mobs 0 min           NMR re-education x 8 min      [place]/Biofeedback 10/10      G. Med activation/sidelying      G. Max Activation/prone      Hip Ext full ROM G. Activation      Bosu Bal and Prop- G Med      Single leg stance/Balance/Prop Tandem stance with SBA    Bosu Retro G. Med act      Static EC on airex and A/P weight shifts; SLS and marching, narrow MARC EO 5 10s marching             Therapeutic Exercise and NMR EXR  [x] (60022) Provided verbal/tactile cueing for activities related to strengthening, flexibility, endurance, ROM for improvements in LE, proximal hip, and core control with self care, mobility, lifting, ambulation.  [] (72087) Provided verbal/tactile cueing for activities related to improving balance, coordination, kinesthetic sense, posture, motor skill, proprioception  to assist with LE, proximal hip, and core control in self care, mobility, lifting, ambulation and eccentric single leg control.      NMR and Therapeutic Activities:    [x] (54570 or 53173) Provided verbal/tactile cueing for activities related to improving balance, coordination, kinesthetic sense, posture, motor skill, proprioception and motor activation to allow for proper function of core, proximal hip and LE with self care and ADLs  [] (81377) Gait Re-education- Provided training and instruction to the patient for proper LE, core and proximal hip recruitment and positioning and eccentric body weight control with ambulation re-education including up and down stairs     Home Exercise Program:    [x] (73495) Reviewed/Progressed HEP activities related to strengthening, flexibility, endurance, ROM

## 2018-04-01 ENCOUNTER — HOSPITAL ENCOUNTER (OUTPATIENT)
Dept: OTHER | Age: 83
Discharge: HOME | End: 2018-04-30
Attending: ORTHOPAEDIC SURGERY | Admitting: ORTHOPAEDIC SURGERY

## 2018-04-11 ENCOUNTER — HOSPITAL ENCOUNTER (OUTPATIENT)
Dept: PHYSICAL THERAPY | Age: 83
Discharge: HOME | End: 2018-04-12
Admitting: ORTHOPAEDIC SURGERY

## 2018-04-28 PROBLEM — R10.9 ABDOMINAL PAIN: Status: ACTIVE | Noted: 2018-04-28

## 2018-04-30 PROBLEM — J96.01 ACUTE RESPIRATORY FAILURE WITH HYPOXIA (HCC): Status: ACTIVE | Noted: 2018-04-30

## 2018-04-30 PROBLEM — N17.9 AKI (ACUTE KIDNEY INJURY) (HCC): Status: ACTIVE | Noted: 2018-04-30

## 2018-04-30 PROBLEM — D69.6 THROMBOCYTOPENIA (HCC): Status: ACTIVE | Noted: 2018-04-30

## 2018-04-30 PROBLEM — E87.6 HYPOKALEMIA: Status: ACTIVE | Noted: 2018-04-30

## 2018-04-30 PROBLEM — I95.9 HYPOTENSION: Status: ACTIVE | Noted: 2018-04-30

## 2018-05-01 ENCOUNTER — HOSPITAL ENCOUNTER (OUTPATIENT)
Dept: OTHER | Age: 83
Discharge: HOME | End: 2018-05-31
Attending: ORTHOPAEDIC SURGERY | Admitting: ORTHOPAEDIC SURGERY

## 2018-05-02 PROBLEM — E87.70 FLUID OVERLOAD: Status: ACTIVE | Noted: 2018-05-02

## 2018-05-07 ENCOUNTER — TELEPHONE (OUTPATIENT)
Dept: CARDIOLOGY CLINIC | Age: 83
End: 2018-05-07

## 2018-05-09 ENCOUNTER — TELEPHONE (OUTPATIENT)
Dept: CARDIOLOGY CLINIC | Age: 83
End: 2018-05-09

## 2018-06-01 ENCOUNTER — HOSPITAL ENCOUNTER (OUTPATIENT)
Dept: OTHER | Age: 83
Discharge: HOME | End: 2018-06-30
Attending: ORTHOPAEDIC SURGERY | Admitting: ORTHOPAEDIC SURGERY